# Patient Record
Sex: FEMALE | Race: WHITE
[De-identification: names, ages, dates, MRNs, and addresses within clinical notes are randomized per-mention and may not be internally consistent; named-entity substitution may affect disease eponyms.]

---

## 2017-01-11 ENCOUNTER — HOSPITAL ENCOUNTER (OUTPATIENT)
Age: 82
Discharge: HOME | End: 2017-01-11
Payer: MEDICARE

## 2017-02-08 ENCOUNTER — HOSPITAL ENCOUNTER (OUTPATIENT)
Age: 82
Discharge: HOME | End: 2017-02-08
Payer: MEDICARE

## 2017-02-09 NOTE — CONSULTATION NOTE
DATE OF CONSULTATION: 02/08/2017 00:00:00

 

TIME OF VISIT: [id number].  

 

REQUESTING PROVIDER: Diana Chambers PA-C.

 

TOPIC: Follow up palliative care consult.

 

Thank you, Diana Chambers PA-C, for asking the palliative care consult service to be involved in the
 care of your patient. I am seeing the patient regarding her depression, pain and symptom management 
in her apartment at [facility]. It is a taxing and considerable effort for her to leave 
the home, as well as to frame interventions in the context of her current setting. 

 

BRIEF HISTORY OF PRESENT ILLNESS UPDATE: This is an 86-year-old woman who has a history of a sustaine
d pelvic fracture including displacement her left superior pubic ramus and minimally displaced sacral
 fracture last fall. This has since healed. She is doing very well with physical therapy and occupati
onal therapy. She had had a left ankle injury again early December, but did not result in a fracture.
 Most recently she was diagnosed with the flu with the sequela of pneumonia. Her respiratory symptoms
 have resolved. Her energy is returning and she is doing quite well. Was originally seen for her depr
ession. I did get a call last week from her daughter. She was reporting that she was having increased
 difficulty with insomnia with resulting irritability and increased anxiety. At that point in time, I
 did increase her mirtazapine up to 30 mg at bedtime. Today, she reports she is sleeping well. Her mo
od is good. She denies anxiety and is overall feeling like her quality of life has improved.

 

SYMPTOM BURDEN: Her pain now is minimal. Most of her residual pain is in her right shoulder. This is 
longstanding and radiates down into her arm and hand area. She is working with physical and occupatio
nal therapy with continued improvement with this. She has titrated down on her oxycodone just 2 tabs 
at bedtime now. Her anxiety is well controlled on her lorazepam 0.5 mg half tab t.i.d. In weighing th
e benefits and burdens of continuing on with this, I did speak with her daughter who feels her mood h
as evened out. In agreement with her past history of anxiety disorder, the agreement was to leave as 
is. Unfortunately, the patient had obtained some alcohol and was removed from the room last night, an
d actually she is the one who brought it up for conversation, I believe testing the water to see if I
 was going to stay true to my restrictions. She, of course, has very little insight into this, but is
 in agreement to continue to follow the rules, which are for her to return to the facility with no fu
rther alcohol, particularly alcohol in her room. I am not quite sure who provided her this, but I quentin
l follow up with the daughter to remind them that we cannot continue with her current medication erick
men if they are going to supplement her with alcohol. She is going down daily for bingo, which is an 
activity she enjoys. She is enjoying her interaction with the rehab staff and she has been out [employer] yesterday with her daughter. She reports her activity tolerance remains fairly compromised, but fe
els like she is getting stronger day by day. She denies shortness of breath. Her longstanding COPD is
 managed with a nebulizer 3-4 times a day. Her cold symptoms have resolved. She also denies nausea. S
he reports her bowels are moving without difficulty and overall feels like things have improved. No c
hanges to her medication list other than to add the mirtazapine, increase it to 30 mg at bedtime.

 

CODE STATUS: POLST IS DO NOT ATTEMPT RESUSCITATION, COMFORT MEASURES ONLY, NO ANTIBIOTICS, AND USE OF
 COMFORT MEASURES TO RELIEVE SYMPTOMS, AND NO ARTIFICIAL NUTRITION. OF NOTE, THEY STILL CONTINUE TO T
REAT WITH ANTIBIOTICS AND ARE INTERESTED IN TREATING REVERSIBLE CONDITIONS. HER DURABLE POWER OF ATTO
MAXIMO IS LETICIA OSEAS, [phone].

 

SOCIAL HISTORY: The patient has 2 sons and a daughter. Marium, her daughter, oversees most of her care
 and does take her out for regular activities. She is still looking forward to her visit from her "pio
y friend" who is 94 years old. She speaks to him daily from [place]. She is going down at noon for meal
s, ambulating with her walker. She has not been down for breakfast because she likes to sleep in and 
she has her own favorite foods in her room for preparation for evening snacks.

 

REVIEW OF SYSTEMS

ENT: The patient has some hearing loss.

CARDIOVASCULAR: No chest pain.

RESPIRATORY: Reports her cough is resolved. She feels like she is back to her baseline.

GASTROINTESTINAL: Bowels are moving well. Her weight has returned to her baseline, which is 110.

GENITOURINARY: She does have frequency, no signs or symptoms of bladder infection.

MUSCULOSKELETAL: She does shower independently, continues to have some difficulty with activity beatrice
ance. She is currently now resumed with OT and PT.

INTEGUMENTARY: Denies rashes, skin issues.

NEUROLOGIC: She does report short-term memory issues. Of note, she does not remember her sequela over
 the last several months and falls. She denies hallucinations or delusions.

PSYCHIATRIC: She denies depression. She is somewhat resigned to her current living situation, though 
would prefer to have something else, though she does not define what that something else is.

ENDOCRINE: The patient is on thyroid medication.

HEMATOLOGIC/IMMUNOLOGIC: Most recently was treated for pneumonia.

 

PHYSICAL EXAMINATION

GENERAL: The patient is bright, she makes good eye contact. She did not demonstrate any pain behavior
s. She is getting back up and down with a lift chair. She reports this really has helped as far as de
creasing her discomfort in her hips and legs and feels like this is mostly resolved.

EYES: Normal on inspection, slight periorbital edema.

ENT: Mucous membranes are moist.

NECK: Trachea midline. No lymphadenopathy.

RESPIRATORY: Her breath sounds are diminished, but clear.

CARDIOVASCULAR: Her temperature is 98.6, O2 saturation 97%, pulse 64, blood pressure 122/58.

ABDOMEN: Flat.

EXTREMITIES: No lower extremity edema. She is using her walker. She does have a little bit of a left 
foot drag, but does appear to be able to maneuver well.

 

PALLIATIVE CARE DISCUSSION: Who is present, myself and the patient. The patient does report she did h
ave a period where she was not sleeping well. She does feel whatever adjustments I made, as best she 
knows has improved. Her appetite is good. Her mood is improved. She is able to identify activities th
at she seems to enjoy. She has no worries. In discussing just how far she has come, she really has no
 recall of when she was feeling so poorly and when we had met, I did give her positive reinforcement 
that she is much more clear. She is stronger. She is feeling much better. She is less anxious and I r
eally encouraged her to continue with her therapy, getting out of her room and finding ways to distra
ct herself.

 

IMPRESSION: This is an 86-year-old woman with multiple morbidities, as her pneumonia since resolved. 
She presents with no pain behaviors today. Reports her depression and anxiety, as well as demonstrate
d by her behaviors, do appear to be controlled.

 

RECOMMENDATIONS/COUNSELING DONE

1. 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 Most of her discomfort is still in the evenings as far 
as just at bedtime. I will leave her 2 oxycodone 5 mg at night. It appears what she is describing is 
a little bit of restless leg syndrome, which she feels the pain pills are working well and she has to
lerated her titration without any difficulty. She does have both ibuprofen and acetaminophen, as well
 as her oxycodone for breakthrough pain if needed.

2. Anxiety, in remission. Again, this is multifactorial in origin. She is doing well on her scheduled
 benzodiazepine of lorazepam 0.5 mg half tab t.i.d. Again, weighing the benefits and burdens, given h
er age, I think her anxiety disorder is well managed on this. We will leave her on this given her str
uggles with her past history with alcohol misuse, is not appearing to cause any sedation or issues an
d has stabilized her mood.

3. Weight loss. She is currently eating well, recently we have increased her mirtazapine to 30 mg. Th
is has addressed her insomnia.

4. Generalized weakness. The patient continues to improve. She is now back into physical and occupati
onal therapy. This also decreases her isolation. She really enjoys the staff that she is working with
 and seems motivated to improve.

5. Dyspnea. She has longstanding COPD. With her recent exacerbation of pneumonia, this has since reso
lved. Her cough is resolved. She continues of course to have a fragile respiratory status, but does a
ppear she is back to her baseline.

 

TIME SPENT: Thirty minutes with greater than 50% of this done in counseling and coordination of care,
 followup with staff. The question was whether she could manage her ibuprofen in her room. The patien
t does have short-term memory issues, and given her propensity for overdoing, I did not leave permiss
ion for her to do that at this point in time. Will consider putting her on hold unless other issues a
rise. I will check in with her daughter in 4-6 weeks and follow up accordingly.

 

Thank you again for this referral. She is doing fairly well at this current time. Certainly, she is f
ragile status and at high risk for recurrence of issues, particularly sequela of a fall or recurrent 
pneumonia.

 

 

 

DD:02/09/2017 07:29:00  DT: 02/09/2017 08:38  JOB #: [id number]  EXT JOB #:[id number]

## 2017-04-18 ENCOUNTER — HOSPITAL ENCOUNTER (OUTPATIENT)
Age: 82
Discharge: HOME | End: 2017-04-18
Payer: MEDICARE

## 2017-04-18 DIAGNOSIS — F32.9: ICD-10-CM

## 2017-04-18 DIAGNOSIS — J44.9: ICD-10-CM

## 2017-04-18 DIAGNOSIS — Z87.01: ICD-10-CM

## 2017-04-18 DIAGNOSIS — Z79.891: ICD-10-CM

## 2017-04-18 DIAGNOSIS — Z87.81: ICD-10-CM

## 2017-04-18 DIAGNOSIS — Z66: ICD-10-CM

## 2017-04-18 DIAGNOSIS — G47.00: ICD-10-CM

## 2017-04-18 DIAGNOSIS — R63.4: ICD-10-CM

## 2017-04-18 DIAGNOSIS — F41.9: ICD-10-CM

## 2017-04-18 DIAGNOSIS — G89.21: ICD-10-CM

## 2017-04-18 DIAGNOSIS — Z91.81: ICD-10-CM

## 2017-04-18 DIAGNOSIS — Z51.5: Primary | ICD-10-CM

## 2017-04-18 DIAGNOSIS — R53.1: ICD-10-CM

## 2017-04-18 DIAGNOSIS — G25.81: ICD-10-CM

## 2017-04-24 ENCOUNTER — HOSPITAL ENCOUNTER (OUTPATIENT)
Age: 82
End: 2017-04-24
Payer: MEDICARE

## 2017-04-24 DIAGNOSIS — Z79.899: Primary | ICD-10-CM

## 2018-01-24 ENCOUNTER — HOSPITAL ENCOUNTER (OUTPATIENT)
Dept: HOSPITAL 76 - RT | Age: 83
Discharge: HOME | End: 2018-01-24
Attending: PHYSICIAN ASSISTANT
Payer: MEDICARE

## 2018-01-24 DIAGNOSIS — J45.909: Primary | ICD-10-CM

## 2018-01-24 PROCEDURE — 94729 DIFFUSING CAPACITY: CPT

## 2018-01-24 PROCEDURE — 94060 EVALUATION OF WHEEZING: CPT

## 2019-11-11 ENCOUNTER — HOSPITAL ENCOUNTER (EMERGENCY)
Dept: HOSPITAL 76 - ED | Age: 84
Discharge: HOME | End: 2019-11-11
Payer: MEDICARE

## 2019-11-11 ENCOUNTER — HOSPITAL ENCOUNTER (OUTPATIENT)
Dept: HOSPITAL 76 - EMS | Age: 84
Discharge: ANOTHER HOSPITAL | End: 2019-11-11
Attending: SURGERY
Payer: MEDICARE

## 2019-11-11 VITALS — SYSTOLIC BLOOD PRESSURE: 166 MMHG | DIASTOLIC BLOOD PRESSURE: 108 MMHG

## 2019-11-11 DIAGNOSIS — S70.01XA: Primary | ICD-10-CM
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
## 2019-11-11 DIAGNOSIS — M79.651: Primary | ICD-10-CM

## 2019-11-11 DIAGNOSIS — Z79.82: ICD-10-CM

## 2019-11-11 DIAGNOSIS — M16.0: ICD-10-CM

## 2019-11-11 DIAGNOSIS — M85.88: ICD-10-CM

## 2019-11-11 DIAGNOSIS — I10: ICD-10-CM

## 2019-11-11 PROCEDURE — 99284 EMERGENCY DEPT VISIT MOD MDM: CPT

## 2019-11-11 PROCEDURE — 99283 EMERGENCY DEPT VISIT LOW MDM: CPT

## 2019-11-11 PROCEDURE — 73502 X-RAY EXAM HIP UNI 2-3 VIEWS: CPT

## 2019-11-11 NOTE — ED PHYSICIAN DOCUMENTATION
PD HPI LOWER EXT INJURY





- Stated complaint


Stated Complaint: GLF





- Chief complaint


Chief Complaint: General





- History obtained from


History obtained from: Patient





- History of Present Illness


PD HPI LOW EXT INJURY LOCATION: Right, Hip


Type of injury: Fall (she says she was getting up to reach for her walker, which

was just a few steps away, and lost balance and fell to side, with pain at right

hip. Was told to stay on ground and EMS called, so patient did not attempt 

weight bearing. She is able to move hip around. Denies injury to 

chest/head/neck/arms.)


Where injury occurred: Home (lives in assisted living)


Timing - onset: How many hours ago (1), Today


Timing - details: Abrupt onset, Still present


Worsened by: Moving, Palpating (to lateral aspect of the right hip.)


Associated symptoms: No: Weakness, Numbness


Contributing factors: No: Anticoagulated, Prosthetic joint


Similar symptoms before: Has not had sx before





Review of Systems


Cardiac: denies: Chest pain / pressure


GI: denies: Abdominal Pain


Musculoskeletal: denies: Neck pain, Back pain


Neurologic: denies: Altered mental status, Head injury





PD PAST MEDICAL HISTORY





- Past Medical History


Cardiovascular: Hypertension


Respiratory: COPD


Endocrine/Autoimmune: None


GI: Other


GYN: None


: None


HEENT: Chronic vision loss


Psych: Anxiety


Musculoskeletal: Osteoporosis, Osteopenia


Derm: None





- Past Surgical History


Past Surgical History: Yes


Ortho: Rotator cuff repair


/GYN: Hysterectomy, Mastectomy





- Present Medications


Home Medications: 


                                Ambulatory Orders











 Medication  Instructions  Recorded  Confirmed


 


Aspirin [Aspir 81] 81 mg PO DAILY 07/01/15 10/07/16


 


Escitalopram [Lexapro] 20 mg PO DAILY 07/01/15 10/07/16


 


LORazepam [Ativan] 0.25 tab BID PRN 07/01/15 10/07/16


 


Amitriptyline [Elavil] 75 mg PO QPM 03/16/16 10/07/16


 


Docusate Sodium 100 mg PO BID 03/16/16 10/07/16


 


Levothyroxine [Synthroid] 100 mcg PO QDAC 03/16/16 10/07/16


 


Montelukast [Singulair] 10 mg PO QPM 03/16/16 10/07/16


 


Multivitamin W/Minerals [Theragran 1 tab PO DAILY 03/16/16 10/07/16





M]   


 


Senna [Senokot] 8.6 mg PO DAILY 03/16/16 10/07/16


 


dilTIAZem HCl [Diltiazem 24Hr ER] 240 mg PO DAILY 03/16/16 10/07/16


 


Ascorbic Acid [Vitamin C] 250 mg BID 03/29/16 10/07/16


 


Ferrous Sulfate 325 mg PO DAILY 03/29/16 10/07/16


 


Albuterol Sulfate [Proair Hfa 1 - 2 puffs INH Q4H PRN 10/07/16 10/07/16





Inhaler]   


 


Cephalexin [Keflex] 500 mg PO BID 10/07/16 10/07/16


 


Fluticasone Propionate [Flovent 250 mcg IH BID 10/07/16 10/07/16





Diskus]   


 


Polyethylene Glycol 3350 [Miralax] 1 applic PO DAILY 10/07/16 10/07/16


 


Acetaminophen [Tylenol] 650 mg PO QID PRN #60 tablet 11/11/19 














- Allergies


Allergies/Adverse Reactions: 


                                    Allergies











Allergy/AdvReac Type Severity Reaction Status Date / Time


 


No Known Drug Allergies Allergy   Verified 10/07/16 09:10














- Social History


Does the pt smoke?: No


Smoking Status: Never smoker


Does the pt drink ETOH?: Yes


Does the pt have substance abuse?: No





- Immunizations


Immunizations are current?: Yes





- POLST


Patient has POLST: No





PD ED PE NORMAL





- Vitals


Vital signs reviewed: Yes





- General


General: Alert and oriented X 3, No acute distress, Well developed/nourished





- HEENT


HEENT: Atraumatic





- Neck


Neck: Supple, no meningeal sign, No bony TTP, No adenopathy





- Cardiac


Cardiac: RRR, No murmur





- Respiratory


Respiratory: Clear bilaterally, Other (no chestwall tenderness)





- Abdomen


Abdomen: Soft, Non tender





- Back


Back: No spinal TTP





- Derm


Derm: Normal color, Warm and dry





- Extremities


Extremities: Other (right lateral hip with some soft tissue tenderness. No pain 

in hip with rotational nor impaction pressure. She has good ROM of the hip 

herself.)





- Neuro


Neuro: Alert and oriented X 3, No motor deficit, No sensory deficit, Normal 

speech





Results





- Vitals


Vitals: 


                               Vital Signs - 24 hr











  11/11/19 11/11/19





  15:47 17:45


 


Temperature 36.7 C 


 


Heart Rate 75 81


 


Respiratory 18 18





Rate  


 


Blood Pressure 123/93 H 166/108 H


 


O2 Saturation 94 93








                                     Oxygen











O2 Source                      Room air

















- Rads (name of study)


  ** right hip xray


Radiology: Prelim report reviewed (osteopenic; questionable line subcapital 

neck), EMP read contemporaneously (normal appearance), See rad report





PD MEDICAL DECISION MAKING





- ED course


Complexity details: reviewed results (questionable line per [facility]. She has good 

ROM of the hip and is able to stand without pain. Clinically seems contusion 

laterally. ), considered differential (She has tenderness over the lateral 

aspect of the hip.  She does have good range of motion of the hip of her own and

 there is no impaction nor rotational pain.), d/w patient





Departure





- Departure


Disposition: 01 Home, Self Care


Clinical Impression: 


Contusion, hip


Qualifiers:


 Encounter type: initial encounter Laterality: right Qualified Code(s): S70.01XA

 - Contusion of right hip, initial encounter





Fall from slip, trip, or stumble


Qualifiers:


 Encounter type: initial encounter Qualified Code(s): W01.0XXA - Fall on same 

level from slipping, tripping and stumbling without subsequent striking against 

object, initial encounter





Condition: Stable


Record reviewed to determine appropriate education?: Yes


Instructions:  ED Contusion Hip


Prescriptions: 


Acetaminophen [Tylenol] 650 mg PO QID PRN #60 tablet


 PRN Reason: PRN PAIN &/OR FEVER


Comments: 


Tylenol 650 mg 4 times daily as needed for pain.  I do not see any fractures on 

your x-ray.  He will likely be sore on the side of the hip for a few days.  

Activity as tolerated.


Discharge Date/Time: 11/11/19 17:50

## 2019-11-11 NOTE — XRAY REPORT
Reason:  tripped and fell to right lateral hip

Procedure Date:  11/11/2019   

Accession Number:  [id number] / [id number]                    

Procedure:  XR  - Hip w/Pelvis 2-3V RT CPT Code:  

 

***Final Report***

 

 

FULL RESULT:

 

 

EXAM:

RIGHT HIP RADIOGRAPHY

 

EXAM DATE: 11/11/2019 04:53 PM.

 

CLINICAL HISTORY: Trauma. Tripped and fell to right lateral hip.

 

COMPARISON: HIP W/PELVIS 2-3V LT 10/07/2016 9:31 AM.

 

TECHNIQUE: 2 views.

 

FINDINGS:

 

Bones: The bones are osteopenic. Question acute subcapital right femoral 

neck fracture. Status post ORIF of a left femoral neck fracture. No 

suspicious osseous lesion.

 

Joints: Moderate bilateral hip osteoarthritis. No right hip dislocation. 

Degenerative changes of the sacroiliac joints and lumbar spine.

 

Other: None.

IMPRESSION:

 

1. Question acute subcapital right femoral neck fracture. Recommend CT 

for further evaluation.

 

2. Status post ORIF of a left femoral neck fracture.

 

 

[facility]

## 2019-11-19 ENCOUNTER — HOSPITAL ENCOUNTER (OUTPATIENT)
Dept: HOSPITAL 76 - DI | Age: 84
Discharge: HOME | End: 2019-11-19
Attending: PHYSICIAN ASSISTANT
Payer: MEDICARE

## 2019-11-19 ENCOUNTER — HOSPITAL ENCOUNTER (OUTPATIENT)
Dept: HOSPITAL 76 - EMS | Age: 84
Discharge: ANOTHER HOSPITAL | End: 2019-11-19
Attending: SURGERY
Payer: MEDICARE

## 2019-11-19 DIAGNOSIS — S72.001A: Primary | ICD-10-CM
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
## 2019-11-19 DIAGNOSIS — M47.816: ICD-10-CM

## 2019-11-19 PROCEDURE — 72192 CT PELVIS W/O DYE: CPT

## 2019-11-19 NOTE — CT REPORT
Reason:  HIP PAIN RT

Procedure Date:  11/19/2019   

Accession Number:  [id number] / [id number]                    

Procedure:  CT  - PELVIS WO CPT Code:  

 

***Final Report***

 

 

FULL RESULT:

 

 

EXAM:

CT BONY PELVIS WITHOUT CONTRAST

 

EXAM DATE: 11/19/2019 01:41 PM.

 

CLINICAL HISTORY: Right hip pain.

 

COMPARISON: HIP W/PELVIS 2-3V RT 11/11/2019 4:34 PM

HIP W/PELVIS 2-3V LT 10/07/2016 9:31 AM.

 

TECHNIQUE: Thin-section axial images were acquired of the pelvis without 

contrast. Post-processing: Coronal and sagittal reformats. Other: None.

 

In accordance with CT protocol optimization, one or more of the following 

dose reduction techniques were utilized for this exam: automated exposure 

control, adjustment of mA and/or KV based on patient size, or use of 

iterative reconstructive technique.

 

FINDINGS:

There is an acute or subacute appearing, mildly impacted, mildly 

angulated fracture of the right femoral neck. No bone lesions.

 

Previous ORIF of left femoral neck fracture which has healed. Healed left 

inferior pubic ramus fracture.

 

Visualized lower lumbar spine: Degenerative changes.

 

Sacroiliac Joints: Arthritic changes.

 

Symphysis Pubis: Arthritic changes.

 

Right Hip: Mild to moderate osteoarthritis. No dislocation or large joint 

effusion.

 

Left Hip: Mild to moderate left hip osteoarthritis. Focal sclerosis and 

cortical irregularity at the anterior superior aspect of the left femoral 

head.

 

Musculature: Normal. No fatty atrophy.

 

Pelvic Cavity: Previous hysterectomy. No free fluid. Calcifications at 

the pelvic cul-de-sac.

 

Other: No lymphadenopathy. No free air or free fluid. The other 

visualized soft tissues are unremarkable.

IMPRESSION:

1. Acute or subacute appearing, mildly impacted, mildly angulated 

fracture of the right femoral neck.

2. Previous ORIF of left femoral neck fracture which is healed. There is 

focal sclerosis and cortical irregularity at the anterosuperior aspect of 

the left femoral head which may represent osteonecrosis.

3. Healed left inferior pubic ramus fracture.

4. Degenerative changes at the visualized lower lumbar spine, sacroiliac 

joints, pubic symphysis, hip joints.

5. Previous hysterectomy.

 

[facility]